# Patient Record
Sex: MALE | Race: WHITE | NOT HISPANIC OR LATINO | Employment: UNEMPLOYED | URBAN - NONMETROPOLITAN AREA
[De-identification: names, ages, dates, MRNs, and addresses within clinical notes are randomized per-mention and may not be internally consistent; named-entity substitution may affect disease eponyms.]

---

## 2017-12-14 ENCOUNTER — TRANSCRIBE ORDERS (OUTPATIENT)
Dept: ORTHOPEDIC SURGERY | Facility: CLINIC | Age: 47
End: 2017-12-14

## 2017-12-14 DIAGNOSIS — M50.30 DEGENERATIVE DISC DISEASE, CERVICAL: Primary | ICD-10-CM

## 2017-12-18 ENCOUNTER — OFFICE VISIT (OUTPATIENT)
Dept: ORTHOPEDIC SURGERY | Facility: CLINIC | Age: 47
End: 2017-12-18

## 2017-12-18 VITALS — HEIGHT: 67 IN | BODY MASS INDEX: 23.86 KG/M2 | WEIGHT: 152 LBS

## 2017-12-18 DIAGNOSIS — M54.16 LUMBAR RADICULAR PAIN: Primary | ICD-10-CM

## 2017-12-18 DIAGNOSIS — M54.9 BACK PAIN, UNSPECIFIED BACK LOCATION, UNSPECIFIED BACK PAIN LATERALITY, UNSPECIFIED CHRONICITY: ICD-10-CM

## 2017-12-18 PROBLEM — M54.2 NECK PAIN: Status: ACTIVE | Noted: 2017-12-18

## 2017-12-18 PROCEDURE — 96372 THER/PROPH/DIAG INJ SC/IM: CPT | Performed by: NURSE PRACTITIONER

## 2017-12-18 PROCEDURE — 99214 OFFICE O/P EST MOD 30 MIN: CPT | Performed by: NURSE PRACTITIONER

## 2017-12-18 RX ORDER — METHYLPREDNISOLONE 4 MG/1
TABLET ORAL
Qty: 21 TABLET | Refills: 0 | OUTPATIENT
Start: 2017-12-18 | End: 2020-01-23

## 2017-12-18 RX ORDER — DICLOFENAC SODIUM 75 MG/1
75 TABLET, DELAYED RELEASE ORAL 2 TIMES DAILY
Qty: 60 TABLET | Refills: 1 | Status: SHIPPED | OUTPATIENT
Start: 2017-12-18 | End: 2018-01-17

## 2017-12-18 NOTE — PROGRESS NOTES
Jamshid Hebert is a 47 y.o. male   Primary provider:  HERNAN Riley       Chief Complaint   Patient presents with   • Lumbar Spine - Pain   • Cervical Spine - Pain       HISTORY OF PRESENT ILLNESS: patient states that pain has been going  On for awhile has gotten worse over the last couple of weeks. Numbness and tingling down both legs, trouble sleeping at night.  Patient has not worked in about one month.     Pain   This is a chronic problem. The current episode started 1 to 4 weeks ago. The problem occurs constantly. The problem has been unchanged. Associated symptoms include chills, a fever and numbness. Associated symptoms comments: Stabbing, aching, burning. . The symptoms are aggravated by standing.        CONCURRENT MEDICAL HISTORY:    Past Medical History:   Diagnosis Date   • Depression    • Migraine        Allergies   Allergen Reactions   • Tramadol Rash         Current Outpatient Prescriptions:   •  diclofenac (VOLTAREN) 75 MG EC tablet, Take 1 tablet by mouth 2 (Two) Times a Day for 30 days., Disp: 60 tablet, Rfl: 1  •  loratadine (CLARITIN) 10 MG tablet, Take 1 tablet by mouth Daily., Disp: 30 tablet, Rfl: 0  •  MethylPREDNISolone (MEDROL, GHAZALA,) 4 MG tablet, Use as directed by package instructions, Disp: 21 tablet, Rfl: 0  •  promethazine (PHENERGAN) 25 MG tablet, Take 1 tablet by mouth Every 6 (Six) Hours As Needed for Nausea or Vomiting., Disp: 20 tablet, Rfl: 0    Current Facility-Administered Medications:   •  ketorolac (TORADOL) injection 60 mg, 60 mg, Intramuscular, Q6H PRN, HERNAN Courtney, 60 mg at 12/19/17 0923    History reviewed. No pertinent surgical history.    History reviewed. No pertinent family history.    Social History     Social History   • Marital status: Single     Spouse name: N/A   • Number of children: N/A   • Years of education: N/A     Occupational History   • Not on file.     Social History Main Topics   • Smoking status: Former Smoker   • Smokeless  "tobacco: Never Used   • Alcohol use No   • Drug use: No   • Sexual activity: Defer     Other Topics Concern   • Not on file     Social History Narrative        Review of Systems   Constitutional: Positive for chills and fever.   Eyes: Positive for visual disturbance.   Neurological: Positive for numbness.   All other systems reviewed and are negative.      PHYSICAL EXAMINATION:       Ht 170.2 cm (67\")  Wt 68.9 kg (152 lb)  BMI 23.81 kg/m2    Physical Exam   Constitutional: He is oriented to person, place, and time. Vital signs are normal. He appears well-developed and well-nourished. He is cooperative.   HENT:   Head: Normocephalic and atraumatic.   Neck: Trachea normal and phonation normal.   Pulmonary/Chest: Effort normal. No respiratory distress.   Abdominal: Soft. Normal appearance. He exhibits no distension.   Neurological: He is alert and oriented to person, place, and time. GCS eye subscore is 4. GCS verbal subscore is 5. GCS motor subscore is 6.   Skin: Skin is warm, dry and intact.   Psychiatric: He has a normal mood and affect. His speech is normal and behavior is normal. Judgment and thought content normal. Cognition and memory are normal.   Vitals reviewed.      GAIT:     [x]  Normal  []  Antalgic    Assistive device: [x]  None  []  Walker     []  Crutches  []  Cane     []  Wheelchair  []  Stretcher    Back Exam     Tenderness   The patient is experiencing tenderness in the sacroiliac and lumbar.    Range of Motion   Extension: abnormal   Flexion: abnormal   Lateral Bend Right: abnormal   Lateral Bend Left: abnormal   Rotation Right: abnormal   Rotation Left: abnormal     Muscle Strength   Right Quadriceps:  4/5   Left Quadriceps:  4/5   Right Hamstrings:  4/5   Left Hamstrings:  4/5     Tests   Straight leg raise right: negative  Straight leg raise left: negative    Other   Toe Walk: normal  Heel Walk: normal  Sensation: normal  Gait: normal   Erythema: no back redness  Scars: " absent            c-spine 12/6/2017 @ [facility].   Impression:  Straightening of the cervical lordosis  Degenerative disc disease c3-4, c4-5, and c5-6  Minimal bony encroachment of the neural foramen for the right c4,c5, and c6 nerve roots and the left c5 and c6 nerve roots.    t-spine   Impression  Mild degenerative changes      l-spine  Mild degenerative disc disease  l2 verterbral body small bone island  No acute lumbar spine abnormality.           ASSESSMENT:    Diagnoses and all orders for this visit:    Lumbar radicular pain  -     MRI Lumbar Spine Without Contrast; Future  -     Ambulatory Referral to Physical Therapy Evaluate and treat  -     Discontinue: ketorolac (TORADOL) injection 60 mg; Inject 2 mL into the shoulder, thigh, or buttocks Every 6 (Six) Hours As Needed for Moderate Pain .  -     Discontinue: triamcinolone acetonide (KENALOG-40) injection 80 mg; Inject 2 mL into the shoulder, thigh, or buttocks 1 (One) Time.  -     ketorolac (TORADOL) injection 60 mg; Inject 2 mL into the shoulder, thigh, or buttocks Every 6 (Six) Hours As Needed for Moderate Pain .  -     triamcinolone acetonide (KENALOG-40) injection 80 mg; Inject 2 mL into the shoulder, thigh, or buttocks 1 (One) Time.    Back pain, unspecified back location, unspecified back pain laterality, unspecified chronicity  -     MRI Lumbar Spine Without Contrast; Future  -     Ambulatory Referral to Physical Therapy Evaluate and treat  -     Discontinue: ketorolac (TORADOL) injection 60 mg; Inject 2 mL into the shoulder, thigh, or buttocks Every 6 (Six) Hours As Needed for Moderate Pain .  -     Discontinue: triamcinolone acetonide (KENALOG-40) injection 80 mg; Inject 2 mL into the shoulder, thigh, or buttocks 1 (One) Time.  -     ketorolac (TORADOL) injection 60 mg; Inject 2 mL into the shoulder, thigh, or buttocks Every 6 (Six) Hours As Needed for Moderate Pain .  -     triamcinolone acetonide (KENALOG-40) injection 80 mg;  Inject 2 mL into the shoulder, thigh, or buttocks 1 (One) Time.    Other orders  -     MethylPREDNISolone (MEDROL, GHAZALA,) 4 MG tablet; Use as directed by package instructions  -     diclofenac (VOLTAREN) 75 MG EC tablet; Take 1 tablet by mouth 2 (Two) Times a Day for 30 days.          PLAN  Recommend MRI of the lumbar spine for further evaluation of pain.   Injected today with steroids and Toradol and placed on pack of steroids to follow with nsaids.     No Follow-up on file.    Boaz Kim, APRN

## 2017-12-19 RX ORDER — TRIAMCINOLONE ACETONIDE 40 MG/ML
80 INJECTION, SUSPENSION INTRA-ARTICULAR; INTRAMUSCULAR ONCE
Status: DISCONTINUED | OUTPATIENT
Start: 2017-12-19 | End: 2017-12-19

## 2017-12-19 RX ORDER — TRIAMCINOLONE ACETONIDE 40 MG/ML
80 INJECTION, SUSPENSION INTRA-ARTICULAR; INTRAMUSCULAR ONCE
Status: COMPLETED | OUTPATIENT
Start: 2017-12-19 | End: 2017-12-19

## 2017-12-19 RX ADMIN — TRIAMCINOLONE ACETONIDE 80 MG: 40 INJECTION, SUSPENSION INTRA-ARTICULAR; INTRAMUSCULAR at 09:24

## 2018-01-03 ENCOUNTER — HOSPITAL ENCOUNTER (OUTPATIENT)
Dept: MRI IMAGING | Facility: HOSPITAL | Age: 48
Discharge: HOME | End: 2018-01-03
Admitting: NURSE PRACTITIONER

## 2018-01-03 DIAGNOSIS — M54.9 BACK PAIN, UNSPECIFIED BACK LOCATION, UNSPECIFIED BACK PAIN LATERALITY, UNSPECIFIED CHRONICITY: ICD-10-CM

## 2018-01-03 DIAGNOSIS — M54.16 LUMBAR RADICULAR PAIN: ICD-10-CM

## 2018-01-03 PROCEDURE — 72148 MRI LUMBAR SPINE W/O DYE: CPT

## 2018-01-19 ENCOUNTER — OFFICE VISIT (OUTPATIENT)
Dept: ORTHOPEDIC SURGERY | Facility: CLINIC | Age: 48
End: 2018-01-19

## 2018-01-19 VITALS — HEIGHT: 67 IN | BODY MASS INDEX: 24.33 KG/M2 | WEIGHT: 155 LBS

## 2018-01-19 DIAGNOSIS — M47.817 SPONDYLOSIS OF LUMBOSACRAL REGION WITHOUT MYELOPATHY OR RADICULOPATHY: ICD-10-CM

## 2018-01-19 DIAGNOSIS — M54.16 LUMBAR RADICULAR PAIN: Primary | ICD-10-CM

## 2018-01-19 PROCEDURE — 99214 OFFICE O/P EST MOD 30 MIN: CPT | Performed by: ORTHOPAEDIC SURGERY

## 2018-01-19 RX ORDER — TRAMADOL HYDROCHLORIDE 50 MG/1
50 TABLET ORAL EVERY 6 HOURS PRN
Qty: 80 TABLET | Refills: 0 | OUTPATIENT
Start: 2018-01-19 | End: 2021-04-18

## 2018-01-19 RX ORDER — BACLOFEN 20 MG/1
20 TABLET ORAL 3 TIMES DAILY
Qty: 80 TABLET | Refills: 1 | OUTPATIENT
Start: 2018-01-19 | End: 2021-04-18
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
## 2018-03-16 ENCOUNTER — HOSPITAL ENCOUNTER (OUTPATIENT)
Dept: MRI IMAGING | Facility: HOSPITAL | Age: 48
Discharge: HOME | End: 2018-03-16
Admitting: PSYCHIATRY & NEUROLOGY

## 2018-03-16 DIAGNOSIS — M54.12 RADICULOPATHY, CERVICAL REGION: ICD-10-CM

## 2018-03-16 PROCEDURE — 72141 MRI NECK SPINE W/O DYE: CPT

## 2020-01-23 ENCOUNTER — APPOINTMENT (OUTPATIENT)
Dept: GENERAL RADIOLOGY | Facility: HOSPITAL | Age: 50
End: 2020-01-23

## 2020-01-23 ENCOUNTER — HOSPITAL ENCOUNTER (EMERGENCY)
Facility: HOSPITAL | Age: 50
Discharge: HOME | End: 2020-01-23
Attending: EMERGENCY MEDICINE | Admitting: EMERGENCY MEDICINE

## 2020-01-23 VITALS
DIASTOLIC BLOOD PRESSURE: 82 MMHG | RESPIRATION RATE: 18 BRPM | SYSTOLIC BLOOD PRESSURE: 135 MMHG | TEMPERATURE: 97.6 F | HEIGHT: 67 IN | HEART RATE: 61 BPM | BODY MASS INDEX: 25.82 KG/M2 | WEIGHT: 164.5 LBS | OXYGEN SATURATION: 99 %

## 2020-01-23 DIAGNOSIS — K27.9 PUD (PEPTIC ULCER DISEASE): ICD-10-CM

## 2020-01-23 DIAGNOSIS — R10.13 EPIGASTRIC PAIN: Primary | ICD-10-CM

## 2020-01-23 LAB
ALBUMIN SERPL-MCNC: 4.3 G/DL (ref 3.5–5.2)
ALBUMIN/GLOB SERPL: 1.4 G/DL
ALP SERPL-CCNC: 83 U/L (ref 39–117)
ALT SERPL W P-5'-P-CCNC: 19 U/L (ref 1–41)
AMYLASE SERPL-CCNC: 68 U/L (ref 28–100)
ANION GAP SERPL CALCULATED.3IONS-SCNC: 12 MMOL/L (ref 5–15)
AST SERPL-CCNC: 16 U/L (ref 1–40)
BASOPHILS # BLD AUTO: 0.08 10*3/MM3 (ref 0–0.2)
BASOPHILS NFR BLD AUTO: 1 % (ref 0–1.5)
BILIRUB SERPL-MCNC: 0.3 MG/DL (ref 0.2–1.2)
BILIRUB UR QL STRIP: NEGATIVE
BUN BLD-MCNC: 9 MG/DL (ref 6–20)
BUN/CREAT SERPL: 8.8 (ref 7–25)
CALCIUM SPEC-SCNC: 9.3 MG/DL (ref 8.6–10.5)
CHLORIDE SERPL-SCNC: 100 MMOL/L (ref 98–107)
CLARITY UR: CLEAR
CO2 SERPL-SCNC: 28 MMOL/L (ref 22–29)
COLOR UR: YELLOW
CREAT BLD-MCNC: 1.02 MG/DL (ref 0.76–1.27)
DEPRECATED RDW RBC AUTO: 35.8 FL (ref 37–54)
EOSINOPHIL # BLD AUTO: 0.08 10*3/MM3 (ref 0–0.4)
EOSINOPHIL NFR BLD AUTO: 1 % (ref 0.3–6.2)
ERYTHROCYTE [DISTWIDTH] IN BLOOD BY AUTOMATED COUNT: 11.9 % (ref 12.3–15.4)
GFR SERPL CREATININE-BSD FRML MDRD: 77 ML/MIN/1.73
GLOBULIN UR ELPH-MCNC: 3.1 GM/DL
GLUCOSE BLD-MCNC: 97 MG/DL (ref 65–99)
GLUCOSE UR STRIP-MCNC: NEGATIVE MG/DL
HCT VFR BLD AUTO: 35.6 % (ref 37.5–51)
HGB BLD-MCNC: 11.9 G/DL (ref 13–17.7)
HGB UR QL STRIP.AUTO: NEGATIVE
HOLD SPECIMEN: NORMAL
IMM GRANULOCYTES # BLD AUTO: 0.02 10*3/MM3 (ref 0–0.05)
IMM GRANULOCYTES NFR BLD AUTO: 0.3 % (ref 0–0.5)
KETONES UR QL STRIP: NEGATIVE
LEUKOCYTE ESTERASE UR QL STRIP.AUTO: NEGATIVE
LIPASE SERPL-CCNC: 50 U/L (ref 13–60)
LYMPHOCYTES # BLD AUTO: 3.14 10*3/MM3 (ref 0.7–3.1)
LYMPHOCYTES NFR BLD AUTO: 39.8 % (ref 19.6–45.3)
MCH RBC QN AUTO: 27.8 PG (ref 26.6–33)
MCHC RBC AUTO-ENTMCNC: 33.4 G/DL (ref 31.5–35.7)
MCV RBC AUTO: 83.2 FL (ref 79–97)
MONOCYTES # BLD AUTO: 0.43 10*3/MM3 (ref 0.1–0.9)
MONOCYTES NFR BLD AUTO: 5.5 % (ref 5–12)
NEUTROPHILS # BLD AUTO: 4.13 10*3/MM3 (ref 1.7–7)
NEUTROPHILS NFR BLD AUTO: 52.4 % (ref 42.7–76)
NITRITE UR QL STRIP: NEGATIVE
NRBC BLD AUTO-RTO: 0 /100 WBC (ref 0–0.2)
PH UR STRIP.AUTO: 7 [PH] (ref 5–9)
PLATELET # BLD AUTO: 241 10*3/MM3 (ref 140–450)
PMV BLD AUTO: 9.7 FL (ref 6–12)
POTASSIUM BLD-SCNC: 3.8 MMOL/L (ref 3.5–5.2)
PROT SERPL-MCNC: 7.4 G/DL (ref 6–8.5)
PROT UR QL STRIP: NEGATIVE
RBC # BLD AUTO: 4.28 10*6/MM3 (ref 4.14–5.8)
SODIUM BLD-SCNC: 140 MMOL/L (ref 136–145)
SP GR UR STRIP: 1.01 (ref 1–1.03)
UROBILINOGEN UR QL STRIP: NORMAL
WBC NRBC COR # BLD: 7.88 10*3/MM3 (ref 3.4–10.8)
WHOLE BLOOD HOLD SPECIMEN: NORMAL

## 2020-01-23 PROCEDURE — 85025 COMPLETE CBC W/AUTO DIFF WBC: CPT | Performed by: EMERGENCY MEDICINE

## 2020-01-23 PROCEDURE — 93010 ELECTROCARDIOGRAM REPORT: CPT | Performed by: INTERNAL MEDICINE

## 2020-01-23 PROCEDURE — 74022 RADEX COMPL AQT ABD SERIES: CPT

## 2020-01-23 PROCEDURE — 96360 HYDRATION IV INFUSION INIT: CPT

## 2020-01-23 PROCEDURE — 93005 ELECTROCARDIOGRAM TRACING: CPT | Performed by: EMERGENCY MEDICINE

## 2020-01-23 PROCEDURE — 81003 URINALYSIS AUTO W/O SCOPE: CPT | Performed by: EMERGENCY MEDICINE

## 2020-01-23 PROCEDURE — 80053 COMPREHEN METABOLIC PANEL: CPT | Performed by: EMERGENCY MEDICINE

## 2020-01-23 PROCEDURE — 82150 ASSAY OF AMYLASE: CPT | Performed by: EMERGENCY MEDICINE

## 2020-01-23 PROCEDURE — 83690 ASSAY OF LIPASE: CPT | Performed by: EMERGENCY MEDICINE

## 2020-01-23 PROCEDURE — 99284 EMERGENCY DEPT VISIT MOD MDM: CPT

## 2020-01-23 RX ORDER — POLYETHYLENE GLYCOL 3350 17 G/17G
17 POWDER, FOR SOLUTION ORAL DAILY
Qty: 5 PACKET | Refills: 0 | OUTPATIENT
Start: 2020-01-23 | End: 2021-04-18

## 2020-01-23 RX ORDER — LANSOPRAZOLE 30 MG/1
30 CAPSULE, DELAYED RELEASE ORAL DAILY
Qty: 21 CAPSULE | Refills: 0 | Status: SHIPPED | OUTPATIENT
Start: 2020-01-23 | End: 2020-02-13

## 2020-01-23 RX ORDER — SODIUM CHLORIDE 9 MG/ML
125 INJECTION, SOLUTION INTRAVENOUS CONTINUOUS
Status: DISCONTINUED | OUTPATIENT
Start: 2020-01-23 | End: 2020-01-23 | Stop reason: HOSPADM

## 2020-01-23 RX ORDER — BUPRENORPHINE HYDROCHLORIDE AND NALOXONE HYDROCHLORIDE DIHYDRATE 8; 2 MG/1; MG/1
1 TABLET SUBLINGUAL DAILY
COMMUNITY

## 2020-01-23 RX ORDER — ONDANSETRON 4 MG/1
4 TABLET, ORALLY DISINTEGRATING ORAL EVERY 8 HOURS PRN
Qty: 10 TABLET | Refills: 0 | Status: SHIPPED | OUTPATIENT
Start: 2020-01-23 | End: 2020-02-07 | Stop reason: SDUPTHER

## 2020-01-23 RX ORDER — SODIUM CHLORIDE 0.9 % (FLUSH) 0.9 %
10 SYRINGE (ML) INJECTION AS NEEDED
Status: DISCONTINUED | OUTPATIENT
Start: 2020-01-23 | End: 2020-01-23 | Stop reason: HOSPADM

## 2020-01-23 RX ADMIN — SODIUM CHLORIDE 125 ML/HR: 900 INJECTION, SOLUTION INTRAVENOUS at 18:40

## 2020-01-24 NOTE — ED PROVIDER NOTES
"Subjective   50yo male pmh significant cervical ddd presents ED c/o 4d hx \"burning\" epigastric discomfort/abdominal bloating/early satiety/associated with nausea, vomiting (resolved.)  Pt seen by pmd earlier today et referred to ED for further evaluation.  ROS neg fever/chills/chest pain/soa/syncope/hematemesis/hematochoezia/melena/dysuria/hematuria/ diarrhea/constipation.      History provided by:  Patient  Abdominal Pain   Pain location:  Epigastric  Pain radiates to:  Does not radiate  Pain severity:  Moderate  Duration:  4 days  Associated symptoms: nausea and vomiting    Associated symptoms: no constipation and no diarrhea        Review of Systems   Constitutional: Negative.    HENT: Negative.    Respiratory: Negative.    Cardiovascular: Negative.    Gastrointestinal: Positive for abdominal pain, nausea and vomiting. Negative for blood in stool, constipation and diarrhea.   Genitourinary: Negative.    Musculoskeletal: Negative.    Skin: Negative.    Allergic/Immunologic: Negative for immunocompromised state.   All other systems reviewed and are negative.      Past Medical History:   Diagnosis Date   • Depression    • Migraine        No Known Allergies    History reviewed. No pertinent surgical history.    History reviewed. No pertinent family history.    Social History     Socioeconomic History   • Marital status:      Spouse name: Not on file   • Number of children: Not on file   • Years of education: Not on file   • Highest education level: Not on file   Tobacco Use   • Smoking status: Former Smoker   • Smokeless tobacco: Never Used   Substance and Sexual Activity   • Alcohol use: No   • Drug use: No   • Sexual activity: Defer           Objective   Physical Exam   Constitutional: He is oriented to person, place, and time. He appears well-developed and well-nourished.   HENT:   Head: Normocephalic and atraumatic.   Mouth/Throat: Oropharynx is clear and moist.   Eyes: Pupils are equal, round, and " reactive to light.   Neck: Normal range of motion. Neck supple. No JVD present. No tracheal deviation present.   Cardiovascular: Normal rate, regular rhythm, normal heart sounds and intact distal pulses. Exam reveals no gallop and no friction rub.   No murmur heard.  Pulmonary/Chest: Effort normal and breath sounds normal. He has no wheezes. He has no rales.   Abdominal: Soft. Bowel sounds are normal. He exhibits no distension and no mass. There is no tenderness. There is no rigidity, no rebound, no guarding, no CVA tenderness, no tenderness at McBurney's point and negative Maier's sign.   Genitourinary: Rectum normal. Rectal exam shows guaiac negative stool.   Musculoskeletal: Normal range of motion.   Lymphadenopathy:     He has no cervical adenopathy.   Neurological: He is alert and oriented to person, place, and time.   Skin: Skin is warm and dry.   Nursing note and vitals reviewed.      ECG 12 Lead    Date/Time: 1/23/2020 7:09 PM  Performed by: Guy Bustamante MD  Authorized by: Guy Bustamante MD   Interpreted by physician  Rhythm: sinus rhythm  Rate: normal  BPM: 60  QRS axis: normal  Conduction: conduction normal  ST Segments: ST segments normal  T Waves: T waves normal  Other: no other findings  Clinical impression: normal ECG                 ED Course      Labs Reviewed   CBC WITH AUTO DIFFERENTIAL - Abnormal; Notable for the following components:       Result Value    Hemoglobin 11.9 (*)     Hematocrit 35.6 (*)     RDW 11.9 (*)     RDW-SD 35.8 (*)     Lymphocytes, Absolute 3.14 (*)     All other components within normal limits   AMYLASE - Normal   LIPASE - Normal   URINALYSIS W/ MICROSCOPIC IF INDICATED (NO CULTURE) - Normal    Narrative:     Urine microscopic not indicated.   COMPREHENSIVE METABOLIC PANEL    Narrative:     GFR Normal >60  Chronic Kidney Disease <60  Kidney Failure <15     CBC AND DIFFERENTIAL    Narrative:     The following orders were created for panel order CBC &  Differential.  Procedure                               Abnormality         Status                     ---------                               -----------         ------                     CBC Auto Differential[[id number]]        Abnormal            Final result                 Please view results for these tests on the individual orders.   EXTRA TUBES    Narrative:     The following orders were created for panel order Extra Tubes.  Procedure                               Abnormality         Status                     ---------                               -----------         ------                     Light Blue Top[[id number]]                                   In process                 Gold Top - SST[[id number]]                                   In process                   Please view results for these tests on the individual orders.   LIGHT BLUE TOP   GOLD TOP - SST     Xr Abdomen 2+ Vw With Chest 1 Vw    Result Date: 1/23/2020  Narrative: PROCEDURE: XR ABDOMEN 2+ VIEWS W CHEST 1 VW INDICATION:  Abdominal pain/discomfort COMPARISON VIEWS:  None FINDINGS: An acute abdominal series was performed by obtaining a frontal chest radiograph and two abdominal radiographs.  CHEST: Heart size: Within normal limits Mediastinal contour: Within normal limits Lungs: No evidence of focal air space disease, grossly negative Pleura: No pleural fluid Osseous: Limited assessment of the osseous structures is unremarkable for age. ABDOMEN: Bowel gas pattern: There are couple of mildly dilated gas-filled loops of small bowel left abdomen measuring up to 3.4 cm in diameter. There is gas and stool throughout the colon as far distally as the rectum. No gross evidence of organomegaly. There is no evidence of free intraperitoneal air. Osseous:  Limited assessment of the osseous structures is unremarkable for age.     Impression: Negative examination. No acute abnormality identified. If pain or symptoms persist beyond reasonable  expectations, a CT examination is suggested, as is deemed clinically appropriate. Electronically signed by:  Mallory Oshea MD  1/23/2020 6:30 PM CST Workstation: [id number]                                             MDM  Number of Diagnoses or Management Options  Epigastric pain: new and requires workup  PUD (peptic ulcer disease): new and requires workup  Diagnosis management comments: Labs unremarkable. AAS nonobstructive. Benign abdominal examination. Abdomen soft nontender. Neg r/r/g/hsm. No evidence peritonitis/obstruction.  Hemoccult negative. Stable discharge w/outaptient pmd/GI followup.       Amount and/or Complexity of Data Reviewed  Clinical lab tests: reviewed  Tests in the radiology section of CPT®: reviewed  Tests in the medicine section of CPT®: reviewed        Final diagnoses:   Epigastric pain   PUD (peptic ulcer disease)            Guy Bustamante MD  01/23/20 1918

## 2020-01-24 NOTE — DISCHARGE INSTRUCTIONS
Clear liquid diet x24hrs  Return ED fever, chest pain, abdominal pain, vomiting, dehydration, bleeding, worse condition, other concerns

## 2020-02-07 ENCOUNTER — APPOINTMENT (OUTPATIENT)
Dept: CT IMAGING | Facility: HOSPITAL | Age: 50
End: 2020-02-07

## 2020-02-07 ENCOUNTER — HOSPITAL ENCOUNTER (EMERGENCY)
Facility: HOSPITAL | Age: 50
Discharge: HOME | End: 2020-02-07
Attending: EMERGENCY MEDICINE | Admitting: EMERGENCY MEDICINE

## 2020-02-07 VITALS
RESPIRATION RATE: 20 BRPM | SYSTOLIC BLOOD PRESSURE: 141 MMHG | TEMPERATURE: 97.4 F | DIASTOLIC BLOOD PRESSURE: 74 MMHG | OXYGEN SATURATION: 97 % | HEIGHT: 67 IN | WEIGHT: 165 LBS | BODY MASS INDEX: 25.9 KG/M2 | HEART RATE: 68 BPM

## 2020-02-07 DIAGNOSIS — N20.1 URETEROLITHIASIS: Primary | ICD-10-CM

## 2020-02-07 LAB
ALBUMIN SERPL-MCNC: 4.5 G/DL (ref 3.5–5.2)
ALBUMIN/GLOB SERPL: 1.7 G/DL
ALP SERPL-CCNC: 87 U/L (ref 39–117)
ALT SERPL W P-5'-P-CCNC: 16 U/L (ref 1–41)
ANION GAP SERPL CALCULATED.3IONS-SCNC: 13 MMOL/L (ref 5–15)
AST SERPL-CCNC: 14 U/L (ref 1–40)
BASOPHILS # BLD AUTO: 0.06 10*3/MM3 (ref 0–0.2)
BASOPHILS NFR BLD AUTO: 0.4 % (ref 0–1.5)
BILIRUB SERPL-MCNC: 0.4 MG/DL (ref 0.2–1.2)
BILIRUB UR QL STRIP: NEGATIVE
BUN BLD-MCNC: 13 MG/DL (ref 6–20)
BUN/CREAT SERPL: 11.3 (ref 7–25)
CALCIUM SPEC-SCNC: 9.8 MG/DL (ref 8.6–10.5)
CHLORIDE SERPL-SCNC: 101 MMOL/L (ref 98–107)
CLARITY UR: CLEAR
CO2 SERPL-SCNC: 26 MMOL/L (ref 22–29)
COLOR UR: YELLOW
CREAT BLD-MCNC: 1.15 MG/DL (ref 0.76–1.27)
DEPRECATED RDW RBC AUTO: 34.5 FL (ref 37–54)
EOSINOPHIL # BLD AUTO: 0.01 10*3/MM3 (ref 0–0.4)
EOSINOPHIL NFR BLD AUTO: 0.1 % (ref 0.3–6.2)
ERYTHROCYTE [DISTWIDTH] IN BLOOD BY AUTOMATED COUNT: 11.7 % (ref 12.3–15.4)
GFR SERPL CREATININE-BSD FRML MDRD: 67 ML/MIN/1.73
GLOBULIN UR ELPH-MCNC: 2.7 GM/DL
GLUCOSE BLD-MCNC: 127 MG/DL (ref 65–99)
GLUCOSE UR STRIP-MCNC: NEGATIVE MG/DL
HCT VFR BLD AUTO: 39.5 % (ref 37.5–51)
HGB BLD-MCNC: 13.3 G/DL (ref 13–17.7)
HGB UR QL STRIP.AUTO: NEGATIVE
HOLD SPECIMEN: NORMAL
HOLD SPECIMEN: NORMAL
IMM GRANULOCYTES # BLD AUTO: 0.05 10*3/MM3 (ref 0–0.05)
IMM GRANULOCYTES NFR BLD AUTO: 0.4 % (ref 0–0.5)
KETONES UR QL STRIP: NEGATIVE
LEUKOCYTE ESTERASE UR QL STRIP.AUTO: NEGATIVE
LIPASE SERPL-CCNC: 21 U/L (ref 13–60)
LYMPHOCYTES # BLD AUTO: 1.27 10*3/MM3 (ref 0.7–3.1)
LYMPHOCYTES NFR BLD AUTO: 9.4 % (ref 19.6–45.3)
MCH RBC QN AUTO: 27.5 PG (ref 26.6–33)
MCHC RBC AUTO-ENTMCNC: 33.7 G/DL (ref 31.5–35.7)
MCV RBC AUTO: 81.6 FL (ref 79–97)
MONOCYTES # BLD AUTO: 0.4 10*3/MM3 (ref 0.1–0.9)
MONOCYTES NFR BLD AUTO: 3 % (ref 5–12)
NEUTROPHILS # BLD AUTO: 11.7 10*3/MM3 (ref 1.7–7)
NEUTROPHILS NFR BLD AUTO: 86.7 % (ref 42.7–76)
NITRITE UR QL STRIP: NEGATIVE
NRBC BLD AUTO-RTO: 0 /100 WBC (ref 0–0.2)
PH UR STRIP.AUTO: <=5 [PH] (ref 5–9)
PLATELET # BLD AUTO: 263 10*3/MM3 (ref 140–450)
PMV BLD AUTO: 9.6 FL (ref 6–12)
POTASSIUM BLD-SCNC: 4.7 MMOL/L (ref 3.5–5.2)
PROT SERPL-MCNC: 7.2 G/DL (ref 6–8.5)
PROT UR QL STRIP: NEGATIVE
RBC # BLD AUTO: 4.84 10*6/MM3 (ref 4.14–5.8)
SODIUM BLD-SCNC: 140 MMOL/L (ref 136–145)
SP GR UR STRIP: 1.02 (ref 1–1.03)
UROBILINOGEN UR QL STRIP: NORMAL
WBC NRBC COR # BLD: 13.49 10*3/MM3 (ref 3.4–10.8)
WHOLE BLOOD HOLD SPECIMEN: NORMAL
WHOLE BLOOD HOLD SPECIMEN: NORMAL

## 2020-02-07 PROCEDURE — 96375 TX/PRO/DX INJ NEW DRUG ADDON: CPT

## 2020-02-07 PROCEDURE — 85025 COMPLETE CBC W/AUTO DIFF WBC: CPT | Performed by: EMERGENCY MEDICINE

## 2020-02-07 PROCEDURE — 81003 URINALYSIS AUTO W/O SCOPE: CPT

## 2020-02-07 PROCEDURE — 25010000002 ONDANSETRON PER 1 MG: Performed by: EMERGENCY MEDICINE

## 2020-02-07 PROCEDURE — 80053 COMPREHEN METABOLIC PANEL: CPT | Performed by: EMERGENCY MEDICINE

## 2020-02-07 PROCEDURE — 96374 THER/PROPH/DIAG INJ IV PUSH: CPT

## 2020-02-07 PROCEDURE — 99284 EMERGENCY DEPT VISIT MOD MDM: CPT

## 2020-02-07 PROCEDURE — 25010000002 KETOROLAC TROMETHAMINE PER 15 MG: Performed by: EMERGENCY MEDICINE

## 2020-02-07 PROCEDURE — 25010000002 IOPAMIDOL 61 % SOLUTION: Performed by: EMERGENCY MEDICINE

## 2020-02-07 PROCEDURE — 25010000002 MORPHINE PER 10 MG: Performed by: EMERGENCY MEDICINE

## 2020-02-07 PROCEDURE — 74177 CT ABD & PELVIS W/CONTRAST: CPT

## 2020-02-07 PROCEDURE — 83690 ASSAY OF LIPASE: CPT | Performed by: EMERGENCY MEDICINE

## 2020-02-07 RX ORDER — KETOROLAC TROMETHAMINE 30 MG/ML
30 INJECTION, SOLUTION INTRAMUSCULAR; INTRAVENOUS ONCE
Status: COMPLETED | OUTPATIENT
Start: 2020-02-07 | End: 2020-02-07

## 2020-02-07 RX ORDER — TAMSULOSIN HYDROCHLORIDE 0.4 MG/1
1 CAPSULE ORAL DAILY
Qty: 30 CAPSULE | Refills: 0 | OUTPATIENT
Start: 2020-02-07 | End: 2021-04-18

## 2020-02-07 RX ORDER — IBUPROFEN 600 MG/1
600 TABLET ORAL EVERY 6 HOURS PRN
Qty: 30 TABLET | Refills: 0 | OUTPATIENT
Start: 2020-02-07 | End: 2021-04-18

## 2020-02-07 RX ORDER — TAMSULOSIN HYDROCHLORIDE 0.4 MG/1
0.4 CAPSULE ORAL ONCE
Status: COMPLETED | OUTPATIENT
Start: 2020-02-07 | End: 2020-02-07

## 2020-02-07 RX ORDER — ONDANSETRON 2 MG/ML
4 INJECTION INTRAMUSCULAR; INTRAVENOUS ONCE
Status: COMPLETED | OUTPATIENT
Start: 2020-02-07 | End: 2020-02-07

## 2020-02-07 RX ORDER — ONDANSETRON 4 MG/1
4 TABLET, ORALLY DISINTEGRATING ORAL EVERY 8 HOURS PRN
Qty: 8 TABLET | Refills: 0 | OUTPATIENT
Start: 2020-02-07 | End: 2021-04-18

## 2020-02-07 RX ADMIN — IOPAMIDOL 90 ML: 612 INJECTION, SOLUTION INTRAVENOUS at 16:05

## 2020-02-07 RX ADMIN — MORPHINE SULFATE 4 MG: 4 INJECTION, SOLUTION INTRAMUSCULAR; INTRAVENOUS at 15:20

## 2020-02-07 RX ADMIN — TAMSULOSIN HYDROCHLORIDE 0.4 MG: 0.4 CAPSULE ORAL at 16:42

## 2020-02-07 RX ADMIN — SODIUM CHLORIDE 1000 ML: 9 INJECTION, SOLUTION INTRAVENOUS at 15:20

## 2020-02-07 RX ADMIN — KETOROLAC TROMETHAMINE 30 MG: 30 INJECTION, SOLUTION INTRAMUSCULAR; INTRAVENOUS at 16:42

## 2020-02-07 RX ADMIN — ONDANSETRON HYDROCHLORIDE 4 MG: 2 INJECTION, SOLUTION INTRAMUSCULAR; INTRAVENOUS at 15:20

## 2020-02-07 NOTE — ED PROVIDER NOTES
Subjective   49 years old male presented in the ER with chief complaint of left lower quadrant sudden onset sharp moderate to severe intensity pain while he was having a bowel movements this morning.  Since then he is having continuous pain  with no radiation, associated with nausea and vomiting.  Denies any hematochezia.  No history of diverticulitis.      History provided by:  Patient  Abdominal Pain   Pain location:  LLQ and suprapubic  Pain quality: sharp    Pain radiates to:  Back  Pain severity:  Severe  Onset quality:  Sudden  Duration:  6 hours  Timing:  Constant  Progression:  Worsening  Chronicity:  New  Relieved by:  Nothing  Ineffective treatments:  None tried  Associated symptoms: constipation, nausea, sore throat and vomiting    Associated symptoms: no chest pain, no cough, no diarrhea, no dysuria, no fatigue, no fever, no flatus, no hematuria and no shortness of breath        Review of Systems   Constitutional: Negative for fatigue and fever.   HENT: Positive for sinus pain and sore throat. Negative for congestion, nosebleeds and sinus pressure.    Respiratory: Negative for cough and shortness of breath.    Cardiovascular: Negative for chest pain.   Gastrointestinal: Positive for abdominal pain, constipation, nausea and vomiting. Negative for diarrhea and flatus.   Genitourinary: Negative for dysuria and hematuria.   Musculoskeletal: Positive for back pain. Negative for neck pain.   Skin: Negative for color change.   Neurological: Negative for syncope and headaches.   Psychiatric/Behavioral: Negative for agitation.       Past Medical History:   Diagnosis Date   • Depression    • Migraine        No Known Allergies    History reviewed. No pertinent surgical history.    History reviewed. No pertinent family history.    Social History     Socioeconomic History   • Marital status:      Spouse name: Not on file   • Number of children: Not on file   • Years of education: Not on file   • Highest  education level: Not on file   Tobacco Use   • Smoking status: Former Smoker   • Smokeless tobacco: Never Used   Substance and Sexual Activity   • Alcohol use: No   • Drug use: No   • Sexual activity: Defer           Objective   Physical Exam   Constitutional: He is oriented to person, place, and time. He appears well-developed and well-nourished.   HENT:   Head: Normocephalic and atraumatic.   Mouth/Throat: Oropharynx is clear and moist.   Eyes: EOM are normal.   Cardiovascular: Normal rate and regular rhythm.   Pulmonary/Chest: Effort normal and breath sounds normal.   Abdominal: Normal appearance. Bowel sounds are decreased. There is tenderness in the periumbilical area, suprapubic area and left lower quadrant. There is guarding.   Neurological: He is alert and oriented to person, place, and time.   Skin: Skin is warm. Capillary refill takes less than 2 seconds.   Psychiatric: He has a normal mood and affect.   Nursing note and vitals reviewed.      Procedures           ED Course                                               MDM  Number of Diagnoses or Management Options  Ureterolithiasis:   Diagnosis management comments:  49 years old is evaluated for the left lower quadrant pain.  He is given IV fluid and pain medication, on reevaluation is much improved.  Work-up showed 2 mm stone at left UVJ with mild hydronephrosis.  I believe is able to pass on its own.  He is given Flomax along with ibuprofen and recommended outpatient urology follow-up.       Amount and/or Complexity of Data Reviewed  Clinical lab tests: ordered and reviewed  Tests in the radiology section of CPT®: ordered and reviewed      Labs Reviewed   COMPREHENSIVE METABOLIC PANEL - Abnormal; Notable for the following components:       Result Value    Glucose 127 (*)     All other components within normal limits    Narrative:     GFR Normal >60  Chronic Kidney Disease <60  Kidney Failure <15     CBC WITH AUTO DIFFERENTIAL - Abnormal; Notable for the  following components:    WBC 13.49 (*)     RDW 11.7 (*)     RDW-SD 34.5 (*)     Neutrophil % 86.7 (*)     Lymphocyte % 9.4 (*)     Monocyte % 3.0 (*)     Eosinophil % 0.1 (*)     Neutrophils, Absolute 11.70 (*)     All other components within normal limits   URINALYSIS W/ MICROSCOPIC IF INDICATED (NO CULTURE) - Normal    Narrative:     Urine microscopic not indicated.   LIPASE - Normal   RAINBOW DRAW    Narrative:     The following orders were created for panel order Roxie Draw.  Procedure                               Abnormality         Status                     ---------                               -----------         ------                     Light Blue Top[[id number]]                                   Final result               Green Top (Gel)[[id number]]                                  Final result               Lavender Top[[id number]]                                     Final result               Gold Top - SST[[id number]]                                   Final result                 Please view results for these tests on the individual orders.   CBC AND DIFFERENTIAL    Narrative:     The following orders were created for panel order CBC & Differential.  Procedure                               Abnormality         Status                     ---------                               -----------         ------                     CBC Auto Differential[[id number]]        Abnormal            Final result                 Please view results for these tests on the individual orders.   LIGHT BLUE TOP   GREEN TOP   LAVENDER TOP   GOLD TOP - SST       Xr Abdomen 2+ Vw With Chest 1 Vw    Result Date: 1/23/2020  Narrative: PROCEDURE: XR ABDOMEN 2+ VIEWS W CHEST 1 VW INDICATION:  Abdominal pain/discomfort COMPARISON VIEWS:  None FINDINGS: An acute abdominal series was performed by obtaining a frontal chest radiograph and two abdominal radiographs.  CHEST: Heart size: Within normal limits Mediastinal contour: Within  normal limits Lungs: No evidence of focal air space disease, grossly negative Pleura: No pleural fluid Osseous: Limited assessment of the osseous structures is unremarkable for age. ABDOMEN: Bowel gas pattern: There are couple of mildly dilated gas-filled loops of small bowel left abdomen measuring up to 3.4 cm in diameter. There is gas and stool throughout the colon as far distally as the rectum. No gross evidence of organomegaly. There is no evidence of free intraperitoneal air. Osseous:  Limited assessment of the osseous structures is unremarkable for age.     Impression: Negative examination. No acute abnormality identified. If pain or symptoms persist beyond reasonable expectations, a CT examination is suggested, as is deemed clinically appropriate. Electronically signed by:  Mallory Oshea MD  1/23/2020 6:30 PM CST Workstation: [id number]    Ct Abdomen Pelvis With Contrast    Result Date: 2/7/2020  Narrative: [facility] [place] Patient Name: OLIVIA SIMONH ATTENDING: MARIELA COVINGTON REFERRING: MARIELA COVINGTON ORDERING: MARIELA COVINGTON ----------------------- PROCEDURE: CT abdomen and pelvis with contrast DATE OF EXAM: 2/7/2020 HISTORY: Left lower quadrant pain Axial images were obtained throughout the abdomen and pelvis following the administration of intravenous and oral contrast. Reformatted sagittal and coronal image sequences were generated. This exam was performed according to our departmental dose-optimization program, which includes automated exposure control, adjustment of the mA and/or kV according to the patient's size and/or use of iterative reconstruction techniques with goal of optimizing doses that are As Low As Reasonably Achievable (ALARA). No previous studies are available for comparison. FINDINGS: The visualized lungs bases are clear of infiltrates or effusions. The liver and spleen appear homogenous and unremarkable. No focal masses are seen. The pancreas and adrenals are normal. The  gallbladder is unremarkable. No biliary or pancreatic duct dilatation is  seen. Examination of the kidneys shows slight diminished enhancement of the left kidney when compared to the right. There is also minimal left hydronephrosis are present. There is a small 2 mm stone identified just inside the bladder at the left ureterovesical junction. There is also a nonobstructing stone in the upper pole of the left kidney measuring 4 mm. No right-sided stones are seen. No renal masses or cysts are identified. No free fluid or inflammatory changes are seen in the abdomen. Appendix is normal. No diverticula are seen in the colon. The abdominal aorta is unremarkable and there is no evidence of retroperitoneal adenopathy. The pelvis shows no masses or adenopathy. Bladder and seminal vesicles appear normal. Prostate is not enlarged. No free fluid or inflammatory changes are noted in the pelvis. The inguinal regions are clear. Osseous structures appear intact with mild degenerative changes in the lumbar spine. There is a benign sclerotic focus is identified at the L2 vertebral body.     Impression: 1. Mild left hydronephrosis with small 2 mm stone located at or just within the bladder at the left ureterovesical junction. 2. Additional nonobstructing stone in upper pole of left kidney. No right-sided stones are seen. 3. No diverticula or inflammatory changes are seen in the abdomen and pelvis. Electronically signed by:  Parmjit Lorenz MD  2/7/2020 4:31 PM [facility] Workstation: [id number]          Final diagnoses:   Ureterolithiasis            Sushil Cheng MD  02/08/20 0025

## 2020-02-07 NOTE — DISCHARGE INSTRUCTIONS
Take Tylenol/ibuprofen/Zofran as needed.  Follow-up with urology for reevaluation.  Return to ER for any worsening.

## 2022-07-17 PROBLEM — M50.00 INTERVERTEBRAL CERVICAL DISC DISORDER WITH MYELOPATHY, CERVICAL REGION: Status: ACTIVE | Noted: 2018-07-25

## 2022-07-17 PROBLEM — G56.00 CARPAL TUNNEL SYNDROME: Status: ACTIVE | Noted: 2018-05-04

## 2022-07-17 PROBLEM — M50.20 DISPLACEMENT OF CERVICAL INTERVERTEBRAL DISC: Status: ACTIVE | Noted: 2018-05-04

## 2023-08-28 ENCOUNTER — TRANSCRIBE ORDERS (OUTPATIENT)
Dept: GENERAL RADIOLOGY | Facility: CLINIC | Age: 53
End: 2023-08-28
Payer: MEDICAID

## 2023-08-28 DIAGNOSIS — M25.551 RIGHT HIP PAIN: ICD-10-CM

## 2023-08-28 DIAGNOSIS — M54.50 LOW BACK PAIN, UNSPECIFIED BACK PAIN LATERALITY, UNSPECIFIED CHRONICITY, UNSPECIFIED WHETHER SCIATICA PRESENT: Primary | ICD-10-CM

## 2023-08-28 DIAGNOSIS — M79.642 LEFT HAND PAIN: ICD-10-CM

## 2023-09-14 ENCOUNTER — OFFICE VISIT (OUTPATIENT)
Dept: CARDIOLOGY | Facility: CLINIC | Age: 53
End: 2023-09-14

## 2023-09-14 VITALS
WEIGHT: 172.6 LBS | DIASTOLIC BLOOD PRESSURE: 82 MMHG | HEIGHT: 67 IN | BODY MASS INDEX: 27.09 KG/M2 | OXYGEN SATURATION: 97 % | HEART RATE: 67 BPM | SYSTOLIC BLOOD PRESSURE: 126 MMHG

## 2023-09-14 DIAGNOSIS — I70.0 ATHEROSCLEROSIS OF AORTA: ICD-10-CM

## 2023-09-14 DIAGNOSIS — Z76.89 ESTABLISHING CARE WITH NEW DOCTOR, ENCOUNTER FOR: Primary | ICD-10-CM

## 2023-09-14 DIAGNOSIS — I73.9 CLAUDICATION: ICD-10-CM

## 2023-09-14 LAB
QT INTERVAL: 374 MS
QTC INTERVAL: 395 MS

## 2023-09-14 PROCEDURE — 99214 OFFICE O/P EST MOD 30 MIN: CPT | Performed by: NURSE PRACTITIONER

## 2023-09-14 PROCEDURE — 93000 ELECTROCARDIOGRAM COMPLETE: CPT | Performed by: INTERNAL MEDICINE

## 2023-09-14 RX ORDER — CLOPIDOGREL BISULFATE 75 MG/1
1 TABLET ORAL DAILY
COMMUNITY
Start: 2023-09-01 | End: 2023-09-26

## 2023-09-14 RX ORDER — GABAPENTIN 300 MG/1
300 CAPSULE ORAL 3 TIMES DAILY
COMMUNITY

## 2023-09-25 ENCOUNTER — DOCUMENTATION (OUTPATIENT)
Dept: CARDIOLOGY | Facility: CLINIC | Age: 53
End: 2023-09-25

## 2023-09-25 NOTE — PROGRESS NOTES
He has no voicemail set up, I called and left voicemail with his wife.  Left message that PRUDENCIO needed to be discussed and to return phone call at [phone] to discuss.

## 2023-09-25 NOTE — PROGRESS NOTES
calcified areas of aorta      Circulatory Problem  This is a new problem. The current episode started more than 1 month ago. The problem occurs daily. The problem has been gradually worsening. Associated symptoms include myalgias and numbness (Lower legs and bilateral hands). Pertinent negatives include no abdominal pain, chest pain, coughing, diaphoresis, nausea, vomiting or weakness. Associated symptoms comments: Back pain, leg pain, numbness in legs.. The symptoms are aggravated by walking and standing. Treatments tried: Rest and pain medications help. The treatment provided mild relief.   Mr. Jamshid Hebert is a 53-year-old  male with medical history of degenerative disc disease, cervical disc disease, osteoarthritis, depression, polyneuropathy, sciatica, GERD, and atherosclerosis of the aorta.    He has been referred by his PCP for heart and vascular work-up.  He presents today with his wife.  He tells me that he has been having low back pain and right hip pain for a while.  He has been to see orthopedic, has had injections and multiple x-rays without relief.  Back x-ray showed calcified areas in the aorta and he was started on Plavix and atorvastatin by his PCP.  He was referred to us for this abnormal back x-ray.    In discussing with him potential symptoms he does endorse claudication in buttock area.  Sometimes relieved by rest and sometimes relieved by pain medication.  He is work has been limited because of significant leg pain.    Past Medical History:   Diagnosis Date    Depression     Migraine      No past surgical history on file.  Social History     Socioeconomic History    Marital status:    Tobacco Use    Smoking status: Former    Smokeless tobacco: Never   Substance and Sexual Activity    Alcohol use: No    Drug use: No    Sexual activity: Defer     No family history on file.    ALLERGIES:  No Known Allergies      Review of Systems   Constitutional: Negative for diaphoresis,  malaise/fatigue, night sweats and weight loss.   Cardiovascular:  Positive for claudication. Negative for chest pain, dyspnea on exertion, leg swelling and orthopnea.   Respiratory:  Negative for cough and shortness of breath.    Endocrine: Negative for polydipsia and polyphagia.   Hematologic/Lymphatic: Negative for bleeding problem. Does not bruise/bleed easily.   Musculoskeletal:  Positive for arthritis, back pain, joint pain and myalgias.   Gastrointestinal:  Negative for bloating, abdominal pain, nausea and vomiting.   Neurological:  Positive for numbness (Lower legs and bilateral hands). Negative for dizziness, light-headedness and weakness.     Current Outpatient Medications   Medication Sig Dispense Refill    Acetaminophen (TYLENOL 8 HOUR ARTHRITIS PAIN PO) Take  by mouth.      atorvastatin (LIPITOR) 10 MG tablet Take 1 tablet by mouth Daily.      clopidogrel (PLAVIX) 75 MG tablet Take 1 tablet by mouth Daily.      diclofenac (VOLTAREN) 75 MG EC tablet Take 1 tablet by mouth 2 (Two) Times a Day. 60 tablet 0    gabapentin (NEURONTIN) 300 MG capsule Take 1 capsule by mouth 3 (Three) Times a Day.      loratadine (CLARITIN) 10 MG tablet Take 1 tablet by mouth Daily. 30 tablet 0    ondansetron ODT (ZOFRAN-ODT) 4 MG disintegrating tablet Place 1 tablet on the tongue Every 8 (Eight) Hours As Needed for Nausea. 30 tablet 0    Vitamin D, Cholecalciferol, (CHOLECALCIFEROL) 10 MCG (400 UNIT) tablet Take 1 tablet by mouth Daily.       No current facility-administered medications for this visit.     OBJECTIVE:    Physical Exam:   Vitals reviewed.   Constitutional:       Appearance: Normal and healthy appearance. Well-developed, well-groomed, overweight and not in distress.   Eyes:      General: Lids are normal.         Right eye: No discharge.         Left eye: No discharge.      Conjunctiva/sclera: Conjunctivae normal.   HENT:      Head: Normocephalic.    Mouth/Throat:      Lips: Pink.      Mouth: Mucous membranes are  "moist.   Neck:      Thyroid: No thyromegaly.      Vascular: No carotid bruit or JVD. JVD normal.      Trachea: Trachea normal.   Pulmonary:      Effort: Pulmonary effort is normal.      Breath sounds: Normal breath sounds and air entry. No decreased breath sounds.   Cardiovascular:      Normal rate. Normal S1 with normal intensity. Normal S2 with normal intensity.       Murmurs: There is no murmur.      No gallop.    Pulses:     Carotid: 2+ bilaterally.     Dorsalis pedis: 0 bilaterally.     Posterior tibial: 0 bilaterally.  Edema:     Peripheral edema absent.   Skin:     General: Skin is warm and dry.      Capillary Refill: Capillary refill takes less than 2 seconds.      Coloration: Skin is not ashen, mottled or pale.        Neurological:      Mental Status: Alert, oriented to person, place, and time and oriented to person, place and time.   Psychiatric:         Attention and Perception: Attention normal.         Mood and Affect: Mood normal.         Speech: Speech normal.         Behavior: Behavior is cooperative.     Vitals:    09/14/23 1300   BP: 126/82   BP Location: Left arm   Patient Position: Sitting   Cuff Size: Adult   Pulse: 67   SpO2: 97%   Weight: 78.3 kg (172 lb 9.6 oz)   Height: 170.2 cm (67\")       DATA REVIEWED:       XR Spine Lumbar 2 or 3 View    Result Date: 8/28/2023  1. Degenerative changes. 2.  Calcific atherosclerotic disease of aorta. 3.  Perhaps mild constipation. 4.  Rest within normal limits.  No acute osseous abnormality     XR Hand 3+ View Left    Result Date: 8/28/2023  1. Degenerative changes in the left and as described most consistent with osteoporosis most prominent in second and third metacarpophalangeal joints. Correlation recommended. 2.  Rest within normal limits.  No acute bony abnormalities in the LEFT hand.     DEXA BONE DENSITY COMPLETE    Result Date: 9/12/2023  IMPRESSION: Osteopenia. DEXA scan was performed using a [employer] A unit. Consider FDA-approved medical " therapies in postmenopausal women and men aged 50 years and older, based on the following: - A hip or vertebral (clinical or morphometric) fracture. - T-score less than or equal to -2.5 at the femoral neck or spine after appropriate evaluation to     exclude secondary causes. - Low bone mass (T-score between -1.0 and -2.5 at the femoral neck or spine) and a 10 year probability of a hip fracture of greater than or equal to 3% or a 10 year probability of a major osteoporosis-related fracture greater than or equal to 20% based on the [place]-adapted WHO algorithm. - Clinicians judgement and/or patient preferences may indicate treatment for people with 10 year fracture probabilities above or below these levels. Link for fracture risk calculator:  https://www.shef.ac.uk/FRAX/    XR Hip With or Without Pelvis 2 - 3 View Right    Result Date: 8/28/2023  1. Unremarkable radiographs of the right hip.    CXR:     CT:     Labs: BMP, CBC, LIPID, TSH  Lab Results   Component Value Date    GLUCOSE 127 (H) 02/07/2020    CALCIUM 9.8 02/07/2020     02/07/2020    K 4.7 02/07/2020    CO2 26.0 02/07/2020     02/07/2020    BUN 13 02/07/2020    CREATININE 1.15 02/07/2020    EGFRIFNONA 67 02/07/2020    BCR 11.3 02/07/2020    ANIONGAP 13.0 02/07/2020     Lab Results   Component Value Date    WBC 13.49 (H) 02/07/2020    HGB 13.3 02/07/2020    HCT 39.5 02/07/2020    MCV 81.6 02/07/2020     02/07/2020     No results found for: CHOL  No results found for: TRIG  No results found for: HDL  No components found for: LDLCALC  No results found for: LDL  No results found for: HDLLDLRATIO  No components found for: CHOLHDL  No results found for: TSH  No results found for: PROBNP      The following portions of the patient's history were reviewed and updated as appropriate: allergies, current medications, past family history, past medical history, past social history, past surgical history and problem list.  Old records reviewed and  "pertinent information is included in the above objective data.     ASSESSMENT/PLAN:     Diagnosis Plan   1. Establishing care with new doctor, encounter for  ECG 12 Lead      2. Claudication  Doppler Arterial Multi Level Lower Extremity - Bilateral CAR      3. Atherosclerosis of aorta  Doppler Arterial Multi Level Lower Extremity - Bilateral CAR        1.  Establishing care with new doctor encounter. EKG obtained prior to our knowledge of why he was being seen by cardiology.  Referral was sent for \"heart and vascular work-up\".  After discussing with patient why he is here it is for abnormal atherosclerosis found on x-ray of lumbar area and claudication    EKG showing normal sinus rhythm without ST or T wave abnormality    2./3.  Atherosclerosis of aorta/claudication.  Lumbar x-rays showing atherosclerosis of aorta.  He has been treated for low back pain and leg pain unsuccessfully.  He endorses claudication when walking and when standing on feet for long periods of time not able to work without significant discomfort.  Also having paresthesias and takes gabapentin for leg pain.  Reporting some relief with rest and with gabapentin, and pain medications.    Doppler PRUDENCIO bilateral lower extremities    I spent 30 minutes caring for Jamshid on this date of service. This time includes time spent by me in the following activities: preparing for the visit, reviewing tests, obtaining and/or reviewing a separately obtained history, performing a medically appropriate examination and/or evaluation, counseling and educating the patient/family/caregiver, ordering medications, tests, or procedures, and documenting information in the medical record  Return in about 4 weeks (around 10/12/2023) for Recheck.      Seen on 9/14/2023  This document has been electronically signed by HERNAN Fulton on September 25, 2023 15:48 CDT   Electronically signed by HERNAN Fulton, 09/25/23, 3:48 PM CDT.            "

## 2023-09-26 ENCOUNTER — DOCUMENTATION (OUTPATIENT)
Dept: CARDIOLOGY | Facility: CLINIC | Age: 53
End: 2023-09-26
Payer: MEDICAID

## 2023-09-26 ENCOUNTER — TELEPHONE (OUTPATIENT)
Dept: CARDIOLOGY | Facility: CLINIC | Age: 53
End: 2023-09-26

## 2023-09-26 DIAGNOSIS — I70.202 ATHEROSCLEROSIS OF ARTERY OF LEFT LOWER EXTREMITY: Primary | ICD-10-CM

## 2023-09-26 DIAGNOSIS — R68.89 ABNORMAL ANKLE BRACHIAL INDEX (ABI): ICD-10-CM

## 2023-09-26 RX ORDER — CILOSTAZOL 50 MG/1
50 TABLET ORAL 2 TIMES DAILY
Qty: 60 TABLET | Refills: 3 | Status: SHIPPED | OUTPATIENT
Start: 2023-09-26

## 2023-09-26 NOTE — TELEPHONE ENCOUNTER
Patients wife called in returning a call from [facility], apologized because they were busy yesterday and didn't have a free chance to call back until after 5pm. She asked to call her cell please, not her .

## 2023-09-26 NOTE — PROGRESS NOTES
I have not been able to get a hold of Mr. Hebert.  At Mrs. Hebert's request I am to call her with results.  I discussed abnormal ABIs over the phone.  He is going to need a abdominal aortogram.  Risk and benefits discussed over the phone they are agreeable to proceed.  We will call her with date and time of appointment.    Primary care provider put him on Plavix.  I want him to stop this medication and we will start Pletal 50 mg twice daily.  This will help improve blood flow down the legs.  She reports that he is still having significant pain in his legs and lower back.    The office will call with time and date of procedure.  She is agreeable with this plan.

## 2023-09-27 ENCOUNTER — TELEPHONE (OUTPATIENT)
Dept: CARDIOLOGY | Facility: CLINIC | Age: 53
End: 2023-09-27
Payer: MEDICAID

## 2023-09-27 ENCOUNTER — PREP FOR SURGERY (OUTPATIENT)
Dept: OTHER | Facility: HOSPITAL | Age: 53
End: 2023-09-27
Payer: MEDICAID

## 2023-09-27 DIAGNOSIS — I70.0 ATHEROSCLEROSIS OF AORTA: ICD-10-CM

## 2023-09-27 DIAGNOSIS — I73.9 CLAUDICATION: Primary | ICD-10-CM

## 2023-09-27 DIAGNOSIS — R68.89 ABNORMAL ANKLE BRACHIAL INDEX (ABI): ICD-10-CM

## 2023-09-27 RX ORDER — ASPIRIN 81 MG/1
81 TABLET ORAL DAILY
Status: CANCELLED | OUTPATIENT
Start: 2023-09-28

## 2023-09-27 RX ORDER — SODIUM CHLORIDE 9 MG/ML
40 INJECTION, SOLUTION INTRAVENOUS AS NEEDED
Status: CANCELLED | OUTPATIENT
Start: 2023-09-27

## 2023-09-27 RX ORDER — ASPIRIN 81 MG/1
81 TABLET, CHEWABLE ORAL ONCE
Status: CANCELLED | OUTPATIENT
Start: 2023-09-27 | End: 2023-09-27

## 2023-09-27 RX ORDER — SODIUM CHLORIDE 0.9 % (FLUSH) 0.9 %
10 SYRINGE (ML) INJECTION AS NEEDED
Status: CANCELLED | OUTPATIENT
Start: 2023-09-27

## 2023-09-27 RX ORDER — SODIUM CHLORIDE 0.9 % (FLUSH) 0.9 %
3 SYRINGE (ML) INJECTION EVERY 12 HOURS SCHEDULED
Status: CANCELLED | OUTPATIENT
Start: 2023-09-27

## 2023-09-27 NOTE — TELEPHONE ENCOUNTER
Odalis Berrios, Isabelle Martínez MA  Will need a abdominal aortogram by Dr. Canseco if he is agreeable.  He has multiple other doctors appointments.    He is NOT available to do a procedure on October 3, 6, 9, 10, 11, or 20th.    Patient scheduled abdominal aortagram on oct 18. He may have a light breakfast prior to 6 am.  They were instructed he should hold pletal the morning of the procedure. All other instructions were discussed with the patient.